# Patient Record
Sex: FEMALE | Race: OTHER | NOT HISPANIC OR LATINO
[De-identification: names, ages, dates, MRNs, and addresses within clinical notes are randomized per-mention and may not be internally consistent; named-entity substitution may affect disease eponyms.]

---

## 2017-02-10 ENCOUNTER — RESULT REVIEW (OUTPATIENT)
Age: 22
End: 2017-02-10

## 2017-03-06 ENCOUNTER — RESULT REVIEW (OUTPATIENT)
Age: 22
End: 2017-03-06

## 2017-08-12 ENCOUNTER — RESULT REVIEW (OUTPATIENT)
Age: 22
End: 2017-08-12

## 2018-04-27 ENCOUNTER — RESULT REVIEW (OUTPATIENT)
Age: 23
End: 2018-04-27

## 2018-05-11 ENCOUNTER — RESULT REVIEW (OUTPATIENT)
Age: 23
End: 2018-05-11

## 2018-09-04 ENCOUNTER — EMERGENCY (EMERGENCY)
Facility: HOSPITAL | Age: 23
LOS: 1 days | Discharge: HOME | End: 2018-09-04
Admitting: EMERGENCY MEDICINE
Payer: COMMERCIAL

## 2018-09-04 VITALS
RESPIRATION RATE: 18 BRPM | SYSTOLIC BLOOD PRESSURE: 104 MMHG | OXYGEN SATURATION: 99 % | HEART RATE: 66 BPM | TEMPERATURE: 98 F | DIASTOLIC BLOOD PRESSURE: 54 MMHG

## 2018-09-04 VITALS
OXYGEN SATURATION: 100 % | HEART RATE: 72 BPM | RESPIRATION RATE: 16 BRPM | DIASTOLIC BLOOD PRESSURE: 72 MMHG | TEMPERATURE: 98 F | SYSTOLIC BLOOD PRESSURE: 118 MMHG

## 2018-09-04 LAB
ALBUMIN SERPL ELPH-MCNC: 3.8 G/DL — SIGNIFICANT CHANGE UP (ref 3.3–5)
ALP SERPL-CCNC: 86 U/L — SIGNIFICANT CHANGE UP (ref 40–120)
ALT FLD-CCNC: 8 U/L — SIGNIFICANT CHANGE UP (ref 4–33)
AST SERPL-CCNC: 18 U/L — SIGNIFICANT CHANGE UP (ref 4–32)
BASOPHILS # BLD AUTO: 0.06 K/UL — SIGNIFICANT CHANGE UP (ref 0–0.2)
BASOPHILS NFR BLD AUTO: 0.6 % — SIGNIFICANT CHANGE UP (ref 0–2)
BILIRUB SERPL-MCNC: 0.2 MG/DL — SIGNIFICANT CHANGE UP (ref 0.2–1.2)
BUN SERPL-MCNC: 10 MG/DL — SIGNIFICANT CHANGE UP (ref 7–23)
CALCIUM SERPL-MCNC: 9.5 MG/DL — SIGNIFICANT CHANGE UP (ref 8.4–10.5)
CHLORIDE SERPL-SCNC: 99 MMOL/L — SIGNIFICANT CHANGE UP (ref 98–107)
CO2 SERPL-SCNC: 26 MMOL/L — SIGNIFICANT CHANGE UP (ref 22–31)
CREAT SERPL-MCNC: 0.77 MG/DL — SIGNIFICANT CHANGE UP (ref 0.5–1.3)
EOSINOPHIL # BLD AUTO: 0.18 K/UL — SIGNIFICANT CHANGE UP (ref 0–0.5)
EOSINOPHIL NFR BLD AUTO: 1.7 % — SIGNIFICANT CHANGE UP (ref 0–6)
GLUCOSE SERPL-MCNC: 83 MG/DL — SIGNIFICANT CHANGE UP (ref 70–99)
HCG SERPL-ACNC: < 5 MIU/ML — SIGNIFICANT CHANGE UP
HCT VFR BLD CALC: 39.3 % — SIGNIFICANT CHANGE UP (ref 34.5–45)
HGB BLD-MCNC: 13.1 G/DL — SIGNIFICANT CHANGE UP (ref 11.5–15.5)
IMM GRANULOCYTES # BLD AUTO: 0.03 # — SIGNIFICANT CHANGE UP
IMM GRANULOCYTES NFR BLD AUTO: 0.3 % — SIGNIFICANT CHANGE UP (ref 0–1.5)
LYMPHOCYTES # BLD AUTO: 2.28 K/UL — SIGNIFICANT CHANGE UP (ref 1–3.3)
LYMPHOCYTES # BLD AUTO: 21.1 % — SIGNIFICANT CHANGE UP (ref 13–44)
MCHC RBC-ENTMCNC: 28.2 PG — SIGNIFICANT CHANGE UP (ref 27–34)
MCHC RBC-ENTMCNC: 33.3 % — SIGNIFICANT CHANGE UP (ref 32–36)
MCV RBC AUTO: 84.5 FL — SIGNIFICANT CHANGE UP (ref 80–100)
MONOCYTES # BLD AUTO: 0.99 K/UL — HIGH (ref 0–0.9)
MONOCYTES NFR BLD AUTO: 9.2 % — SIGNIFICANT CHANGE UP (ref 2–14)
NEUTROPHILS # BLD AUTO: 7.25 K/UL — SIGNIFICANT CHANGE UP (ref 1.8–7.4)
NEUTROPHILS NFR BLD AUTO: 67.1 % — SIGNIFICANT CHANGE UP (ref 43–77)
NRBC # FLD: 0 — SIGNIFICANT CHANGE UP
PLATELET # BLD AUTO: 271 K/UL — SIGNIFICANT CHANGE UP (ref 150–400)
PMV BLD: 10 FL — SIGNIFICANT CHANGE UP (ref 7–13)
POTASSIUM SERPL-MCNC: 3.8 MMOL/L — SIGNIFICANT CHANGE UP (ref 3.5–5.3)
POTASSIUM SERPL-SCNC: 3.8 MMOL/L — SIGNIFICANT CHANGE UP (ref 3.5–5.3)
PROT SERPL-MCNC: 7.5 G/DL — SIGNIFICANT CHANGE UP (ref 6–8.3)
RBC # BLD: 4.65 M/UL — SIGNIFICANT CHANGE UP (ref 3.8–5.2)
RBC # FLD: 14.7 % — HIGH (ref 10.3–14.5)
SODIUM SERPL-SCNC: 138 MMOL/L — SIGNIFICANT CHANGE UP (ref 135–145)
WBC # BLD: 10.79 K/UL — HIGH (ref 3.8–10.5)
WBC # FLD AUTO: 10.79 K/UL — HIGH (ref 3.8–10.5)

## 2018-09-04 PROCEDURE — 99283 EMERGENCY DEPT VISIT LOW MDM: CPT

## 2018-09-04 RX ORDER — SODIUM CHLORIDE 9 MG/ML
2000 INJECTION INTRAMUSCULAR; INTRAVENOUS; SUBCUTANEOUS ONCE
Qty: 0 | Refills: 0 | Status: COMPLETED | OUTPATIENT
Start: 2018-09-04 | End: 2018-09-04

## 2018-09-04 RX ADMIN — SODIUM CHLORIDE 2000 MILLILITER(S): 9 INJECTION INTRAMUSCULAR; INTRAVENOUS; SUBCUTANEOUS at 16:30

## 2018-09-04 NOTE — ED PROVIDER NOTE - PROGRESS NOTE DETAILS
MARBELLA Pichardo H&FRED bartlett, multiple attempts taken to contact private gyn ([phone]), phone busy.

## 2018-09-04 NOTE — ED PROVIDER NOTE - MEDICAL DECISION MAKING DETAILS
DUB since pt started OCP, ucg negative, small amount of bleeding on exam - will check H&H, IV hydration, will attempt to contact pt's gyn doctor.

## 2018-09-04 NOTE — ED PROVIDER NOTE - CARE PLAN
Principal Discharge DX:	Dysfunctional uterine bleeding  Assessment and plan of treatment:	PLEASE MAKE SURE THAT YOU FOLLOW UP WITH YOUR GYN DOCTOR TOMORROW. PLEASE STOP TAKING THE MEDICATION GIVEN TO YOU BY YOUR GYN DOCTOR. RETURN TO ER FOR ABDOMINAL PAIN, INCREASED BLEEDING, CHEST PAIN, SHORTNESS OF BREATH, DIZZINESS..

## 2018-09-04 NOTE — ED PROVIDER NOTE - OBJECTIVE STATEMENT
Pt is 22 y/o female with no significant PMhx here for eval of vaginal bleeding. Pt is 24 y/o female with no significant PMhx here for eval of vaginal bleeding. Pt admits that her periods have been always irregular, she hasn't had her period since May of 2018, saw her ob/gyn for the same 2 days ago and was rxed estradiol pills - pt started bleeding on the same day that the medications was started, has been going through 1 pad Q2-3hrs with small clots passage, also admits to generalized malaise. Denies cp, sob, palpitations, denies dizziness or syncopal episodes. now with lower abd cramps. (-) n/v, urinary sx; ucg negative. Follows up with gyn in [facility], doesn't recall the name. had neg us 2 days ago.

## 2018-09-04 NOTE — ED PROVIDER NOTE - PLAN OF CARE
PLEASE MAKE SURE THAT YOU FOLLOW UP WITH YOUR GYN DOCTOR TOMORROW. PLEASE STOP TAKING THE MEDICATION GIVEN TO YOU BY YOUR GYN DOCTOR. RETURN TO ER FOR ABDOMINAL PAIN, INCREASED BLEEDING, CHEST PAIN, SHORTNESS OF BREATH, DIZZINESS..

## 2019-07-10 ENCOUNTER — RESULT REVIEW (OUTPATIENT)
Age: 24
End: 2019-07-10

## 2019-11-10 ENCOUNTER — EMERGENCY (EMERGENCY)
Facility: HOSPITAL | Age: 24
LOS: 0 days | Discharge: HOME | End: 2019-11-10
Payer: COMMERCIAL

## 2019-11-10 VITALS
SYSTOLIC BLOOD PRESSURE: 120 MMHG | WEIGHT: 175.05 LBS | RESPIRATION RATE: 18 BRPM | DIASTOLIC BLOOD PRESSURE: 76 MMHG | OXYGEN SATURATION: 96 % | TEMPERATURE: 98 F | HEART RATE: 111 BPM

## 2019-11-10 DIAGNOSIS — K08.89 OTHER SPECIFIED DISORDERS OF TEETH AND SUPPORTING STRUCTURES: ICD-10-CM

## 2019-11-10 DIAGNOSIS — Z79.1 LONG TERM (CURRENT) USE OF NON-STEROIDAL ANTI-INFLAMMATORIES (NSAID): ICD-10-CM

## 2019-11-10 DIAGNOSIS — Z79.52 LONG TERM (CURRENT) USE OF SYSTEMIC STEROIDS: ICD-10-CM

## 2019-11-10 DIAGNOSIS — K02.9 DENTAL CARIES, UNSPECIFIED: ICD-10-CM

## 2019-11-10 PROCEDURE — 99283 EMERGENCY DEPT VISIT LOW MDM: CPT

## 2019-11-10 RX ORDER — IBUPROFEN 200 MG
0 TABLET ORAL
Qty: 0 | Refills: 0 | DISCHARGE

## 2019-11-10 RX ORDER — AMOXICILLIN 250 MG/5ML
1 SUSPENSION, RECONSTITUTED, ORAL (ML) ORAL
Qty: 0 | Refills: 0 | DISCHARGE

## 2019-11-10 RX ORDER — OXYCODONE AND ACETAMINOPHEN 5; 325 MG/1; MG/1
1 TABLET ORAL ONCE
Refills: 0 | Status: DISCONTINUED | OUTPATIENT
Start: 2019-11-10 | End: 2019-11-10

## 2019-11-10 RX ADMIN — OXYCODONE AND ACETAMINOPHEN 1 TABLET(S): 5; 325 TABLET ORAL at 11:42

## 2019-11-10 NOTE — ED ADULT TRIAGE NOTE - CHIEF COMPLAINT QUOTE
pt BIB mother with c/o dental pain states that she had 3 wisdom teeth removed on Friday and no relief in pain

## 2019-11-10 NOTE — ED ADULT NURSE NOTE - OBJECTIVE STATEMENT
pt A&ox4 BIB mother with c/o dental pain states that she had 3 wisdom teeth removed on Friday and no relief in pain. Pt states she really cant eat food.  also with c/o of nausea/vomiting. Denies PMH

## 2019-11-11 NOTE — ED PROVIDER NOTE - OBJECTIVE STATEMENT
25 y/o F presents to ED c/o dental pain pt stated that she had tooth extraction yesterday sent home on ibuprofen with no relief, no associated symptoms denies headache, fever, N/V dizziness and other concerns.

## 2019-11-11 NOTE — ED PROVIDER NOTE - PATIENT PORTAL LINK FT
You can access the FollowMyHealth Patient Portal offered by [facility] by registering at the following website: [email or web address]. By joining [employer]’s FollowMyHealth portal, you will also be able to view your health information using other applications (apps) compatible with our system.

## 2019-11-11 NOTE — ED PROVIDER NOTE - ENMT, MLM
Airway patent, Nasal mucosa clear. Mouth with normal mucosa. Throat has no vesicles, no oropharyngeal exudates and uvula is midline. tooth # 3,4 extraction no gingiva swelling or discharge.

## 2019-11-11 NOTE — ED PROVIDER NOTE - CLINICAL SUMMARY MEDICAL DECISION MAKING FREE TEXT BOX
25 y/o F with tooth pain not relieving with motrin but relieved with percocet pt admits to feeling much better and agrees to dc little and f/u with dentist as scheduled, if worsening pain she can return to ED.

## 2023-03-09 ENCOUNTER — EMERGENCY (EMERGENCY)
Facility: HOSPITAL | Age: 28
LOS: 1 days | Discharge: HOME | End: 2023-03-09
Attending: EMERGENCY MEDICINE
Payer: MEDICAID

## 2023-03-09 VITALS
HEIGHT: 70 IN | RESPIRATION RATE: 20 BRPM | HEART RATE: 100 BPM | TEMPERATURE: 98 F | OXYGEN SATURATION: 100 % | SYSTOLIC BLOOD PRESSURE: 111 MMHG | DIASTOLIC BLOOD PRESSURE: 81 MMHG | WEIGHT: 179.9 LBS

## 2023-03-09 VITALS
HEART RATE: 93 BPM | DIASTOLIC BLOOD PRESSURE: 76 MMHG | TEMPERATURE: 98 F | RESPIRATION RATE: 19 BRPM | OXYGEN SATURATION: 97 % | SYSTOLIC BLOOD PRESSURE: 113 MMHG

## 2023-03-09 PROCEDURE — 96372 THER/PROPH/DIAG INJ SC/IM: CPT

## 2023-03-09 PROCEDURE — 99284 EMERGENCY DEPT VISIT MOD MDM: CPT

## 2023-03-09 PROCEDURE — 99284 EMERGENCY DEPT VISIT MOD MDM: CPT | Mod: 25

## 2023-03-09 RX ORDER — KETOROLAC TROMETHAMINE 30 MG/ML
30 SYRINGE (ML) INJECTION ONCE
Refills: 0 | Status: DISCONTINUED | OUTPATIENT
Start: 2023-03-09 | End: 2023-03-09

## 2023-03-09 RX ORDER — DEXAMETHASONE 0.5 MG/5ML
10 ELIXIR ORAL ONCE
Refills: 0 | Status: COMPLETED | OUTPATIENT
Start: 2023-03-09 | End: 2023-03-09

## 2023-03-09 RX ADMIN — Medication 30 MILLIGRAM(S): at 14:47

## 2023-03-09 RX ADMIN — Medication 10 MILLIGRAM(S): at 14:48

## 2023-03-09 NOTE — ED ADULT NURSE NOTE - NS TRANSFER PATIENT BELONGINGS
Patient comes in to check her ears. Has in the canal  Lyrica type aids. She's supposed to coming every month and get them removed. However she does not like pain in the $100 when she is out of town in [place], and his left left one in for 3 months. Had cerumen impactions in the right side and one in that removed so she can have it replaced. No otorrhea. No otalgia.    Exam shows cerumen in the right side which I removed. Ear canal and eardrum otherwise look good. On the left she had may remove the hearing aid and I gave it to her. Unfortunately she has a significant callus-like type formation where the medial portion of the hearing aid was sitting. As such, advised her she needs to leave this ear alone for at least a couple months and have it rechecked to make sure that this resolves. She should not have a canal aid put in until then. She states she may follow up with an ear doctor closer to home in [place]. Majority of the 15 minutes spent with her involved counseling in this regard.  
Clothing

## 2023-03-09 NOTE — ED PROVIDER NOTE - NS ED ROS FT
Constitutional: No fever, chills.  Eyes:  No visual changes  ENMT:  +sore throat   Cardiac:  No chest pain  Respiratory:  No cough, SOB  GI:  No nausea, vomiting, diarrhea, abdominal pain.  :  No dysuria, hematuria  MS:  No back pain.  Neuro:  No headache or lightheadedness  Skin:  No skin rash  Except as documented in the HPI,  all other systems are negative.

## 2023-03-09 NOTE — ED ADULT NURSE NOTE - OBJECTIVE STATEMENT
28 y/o female presents with throat pain. Patient endorses throat pain for several days, seen at urgent care and prescribed amoxicillin. On exam, AOx3, answering questions. Unlabored, spontaneous respirations, NAD. Awaiting evaluation by MD at this time.

## 2023-03-09 NOTE — ED PROVIDER NOTE - OBJECTIVE STATEMENT
27y F p/w sore throat x 2days  pt [facility] was given [facility]. states she is still having trouble swallowing due to pain. did not take any meds pta today. tolerating secretions and PO solids and liquid. no fever, cp, sob, vomiting, diarrhea, swelling, rash, ill contacts

## 2023-03-09 NOTE — ED PROVIDER NOTE - NSFOLLOWUPINSTRUCTIONS_ED_ALL_ED_FT
Advance activity as tolerated.  Continue all previously prescribed medications as directed unless otherwise instructed.  Follow up with your primary care physician in 48-72 hours- bring copies of your results.  Return to the ER for worsening or persistent symptoms, and/or ANY NEW OR CONCERNING SYMPTOMS. If you have issues obtaining follow up, please call: [phone] to obtain a doctor or specialist who takes your insurance in your area.  You may call [phone] to make an appointment with the internal medicine clinic.    Take Motrin 600mg every 8hrs with food for pain. Alternate with tylenol 1000 mg every 6-8 hours, do not take more than 4000 mg of tylenol within 24 hours.

## 2023-03-09 NOTE — ED PROVIDER NOTE - PATIENT PORTAL LINK FT
You can access the FollowMyHealth Patient Portal offered by [facility] by registering at the following website: [email or web address]. By joining [employer]’s FollowMyHealth portal, you will also be able to view your health information using other applications (apps) compatible with our system.

## 2023-03-09 NOTE — ED PROVIDER NOTE - CLINICAL SUMMARY MEDICAL DECISION MAKING FREE TEXT BOX
27y f p/w sore throat. vss, on exam +b/l tonsillar swelling and erythema. no exudates, uvula midline. low suspicion for rpa, tonsilar abscess.   likely tonsilitis. will dose with nsaids and decadron. likely dc with pcp follow up. 27y f p/w sore throat. vss, on exam +b/l tonsillar swelling and erythema. no exudates, uvula midline. low suspicion for rpa, tonsilar abscess.   likely tonsilitis. will dose with nsaids and decadron. likely dc with pcp follow up.    Faye: 27 year old female with sore throat. patient treated amoxcillin two days ago at . not taking anything for pain. states has pain, no fever, no chills. not able to take much PO. pE:  b/l tonsilar swelling and erythema, midline uvula, no drooling, no tongue swelling, no PTA on exam. nonlabored respirations, + s1s2, no focal deficits, noraml gait, alert and oriented x 3, skin intact.

## 2023-08-23 NOTE — ED ADULT NURSE NOTE - CINV DISCH MEDS REVIEWED YN
Refill Routing Note   Medication(s) are not appropriate for processing by [facility] for the following reason(s):      Drug-disease interaction    [facility] action(s):  Defer Care Due:  None identified     Medication Therapy Plan: FOVS; FLOS; ANORO ELLIPTA and Chronic obstructive pulmonary disease with acute lower respiratory infection    Pharmacist review requested: Yes     Appointments  past 12m or future 3m with PCP    Date Provider   Last Visit   4/20/2023 Nallely Valderrama MD   Next Visit   10/23/2023 Nallely Valderrama MD   ED visits in past 90 days: 0        Note composed:8:57 AM 08/23/2023            Yes

## 2023-12-18 NOTE — ED PROVIDER NOTE - PHYSICAL EXAMINATION
Griselda Romero  2002    S:  21 y.o. female with c/o pelvic pain x few months. Pain is noted as cramping discomfort beginning 1-2 days prior to period and throughout menses. Periods have always been somewhat painful but never for this duration or intensity. Not tx with OTC medications. Menses are regular, monthly, lasting 5 days. Changes regular sized pad q 2 hrs.     Started on Erika 8/2022 as she was planning to go on accutane. Seen for f/u in 2/2023 with no complaints. Stopped birth control a few months ago due to no plan to start Accutane.   SA with single male partner. This is a new partner and she would like GC/Ct testing. Reports PCB and vulvovaginal itching recently. No dyspareunia or vaginal dryness. Denies discharge, odor, or urinary complaints.   Last pap was 3/2022 NILM and had a pevlic US 7/2021 which was normal.     Review of Systems   Constitutional: Negative   Gastrointestinal: Negative  Genitourinary: + as above noted . Negative for urinary urgency, frequency, dysuria, hematuria, vaginal discharge, or odor.     O:  /88 (BP Location: Left arm, Patient Position: Sitting, Cuff Size: Standard)   Pulse 82   Wt 75.9 kg (167 lb 6.4 oz)   LMP 12/04/2023   BMI 31.63 kg/m²   She appears well and is in no distress  Normocephalic, atraumatic.   Normal respiratory effort  Abdomen is soft and nontender  External genitals are normal without lesions or rashes  Vagina: small amount of clumpy white discharge. No erythema or bleeding.   Cervix is friable without lesions or discharge. No CMT.  Uterus is nontender, no masses  Adnexa are nontender, no pelvic masses appreciated  No focal neurological deficits.   Normal mood, affect, and behavior.       A/P:  Diagnoses and all orders for this visit:    Dysmenorrhea  -     drospirenone-ethinyl estradiol (ERIKA) 3-0.02 MG per tablet; Take 1 tablet by mouth daily    Discussed options for management.   Pain previously well controlled on erika and only restarted with  d/c  No contraindications to estrogen reported. Rx sent.   Plan to revisit control at upcoming annual. Discussed pelvic US if pain or PCB persists. Agreeable.     Compliance with daily pill taking reinforced. Reviewed management for missed pills.   Reviewed possibility for irregular bleeding in first 3 months of pill use. Can call with concerns.  Condoms recommended for STD prevention.   Warning sings of use reviewed. She will report these immediately should they occur.   Potential for drug interaction reviewed. Should notify all HCP of use to avoid dec efficacy     RTO for annual exam and pill check as scheduled       Screening for STDs (sexually transmitted diseases)  -     Chlamydia/GC amplified DNA by PCR  -     POCT wet mount    Will notify with results. Condoms recommended for STD prevention.     Postcoital bleeding  -     Chlamydia/GC amplified DNA by PCR  -     Liquid-based pap, screening  -     POCT wet mount  -     fluconazole (DIFLUCAN) 150 mg tablet; Take one tablet now, then one tablet in 3 days.    Yeast present on wet mount. Rx for diflucan sent and use reviewed.   Will check Pap smear and GC/Ct testing.  If persistent can consider pelvic US.  Plan pending results.     Yeast infection  -     fluconazole (DIFLUCAN) 150 mg tablet; Take one tablet now, then one tablet in 3 days.         Vital signs reviewed  GENERAL: Patient nontoxic appearing, NAD  HEAD: NCAT  EYES: Anicteric  ENT: MMM. uvula midline. +tonsillar erythema, mild swelling. no neck swelling. no exudates   NECK: Supple, non tender  RESPIRATORY: Normal respiratory effort. CTA B/L. No wheezing, rales, rhonchi  CARDIOVASCULAR: Regular rate and rhythm  ABDOMEN: Soft. Nondistended. Nontender. No guarding or rebound. No CVA tenderness.  MUSCULOSKELETAL/EXTREMITIES: Brisk cap refill. 2+ radial pulses. No leg edema.  SKIN:  Warm and dry  NEURO: AAOx3. No gross FND.  PSYCHIATRIC: Cooperative. Affect appropriate.

## 2024-10-16 ENCOUNTER — EMERGENCY (EMERGENCY)
Facility: HOSPITAL | Age: 29
LOS: 1 days | Discharge: HOME | End: 2024-10-16
Attending: EMERGENCY MEDICINE
Payer: COMMERCIAL

## 2024-10-16 VITALS
DIASTOLIC BLOOD PRESSURE: 81 MMHG | WEIGHT: 220.02 LBS | RESPIRATION RATE: 16 BRPM | TEMPERATURE: 98 F | HEART RATE: 91 BPM | SYSTOLIC BLOOD PRESSURE: 115 MMHG | HEIGHT: 67 IN | OXYGEN SATURATION: 98 %

## 2024-10-16 PROCEDURE — 99283 EMERGENCY DEPT VISIT LOW MDM: CPT | Mod: 25

## 2024-10-16 PROCEDURE — 81025 URINE PREGNANCY TEST: CPT

## 2024-10-16 PROCEDURE — 99284 EMERGENCY DEPT VISIT MOD MDM: CPT

## 2024-10-16 RX ORDER — DIAZEPAM 5 MG/1
5 TABLET ORAL ONCE
Refills: 0 | Status: DISCONTINUED | OUTPATIENT
Start: 2024-10-16 | End: 2024-10-16

## 2024-10-16 RX ORDER — IBUPROFEN 200 MG
600 TABLET ORAL ONCE
Refills: 0 | Status: COMPLETED | OUTPATIENT
Start: 2024-10-16 | End: 2024-10-16

## 2024-10-16 RX ORDER — LIDOCAINE HYDROCHLORIDE 20 MG/ML
1 JELLY TOPICAL ONCE
Refills: 0 | Status: COMPLETED | OUTPATIENT
Start: 2024-10-16 | End: 2024-10-16

## 2024-10-16 RX ORDER — ACETAMINOPHEN 500 MG/5ML
975 LIQUID (ML) ORAL ONCE
Refills: 0 | Status: COMPLETED | OUTPATIENT
Start: 2024-10-16 | End: 2024-10-16

## 2024-10-16 RX ADMIN — Medication 600 MILLIGRAM(S): at 23:47

## 2024-10-16 RX ADMIN — DIAZEPAM 5 MILLIGRAM(S): 5 TABLET ORAL at 23:47

## 2024-10-16 RX ADMIN — LIDOCAINE HYDROCHLORIDE 1 PATCH: 20 JELLY TOPICAL at 23:22

## 2024-10-17 VITALS
DIASTOLIC BLOOD PRESSURE: 74 MMHG | HEART RATE: 85 BPM | OXYGEN SATURATION: 98 % | RESPIRATION RATE: 16 BRPM | SYSTOLIC BLOOD PRESSURE: 109 MMHG | TEMPERATURE: 98 F

## 2024-10-17 RX ORDER — DIAZEPAM 5 MG/1
1 TABLET ORAL
Qty: 8 | Refills: 0
Start: 2024-10-17 | End: 2024-10-19

## 2025-01-03 ENCOUNTER — RESULT REVIEW (OUTPATIENT)
Age: 30
End: 2025-01-03